# Patient Record
Sex: FEMALE | Race: ASIAN | NOT HISPANIC OR LATINO | URBAN - METROPOLITAN AREA
[De-identification: names, ages, dates, MRNs, and addresses within clinical notes are randomized per-mention and may not be internally consistent; named-entity substitution may affect disease eponyms.]

---

## 2019-07-23 ENCOUNTER — INPATIENT (INPATIENT)
Facility: HOSPITAL | Age: 34
LOS: 2 days | Discharge: HOME | DRG: 443 | End: 2019-07-26
Attending: INTERNAL MEDICINE | Admitting: INTERNAL MEDICINE
Payer: COMMERCIAL

## 2019-07-23 VITALS
DIASTOLIC BLOOD PRESSURE: 70 MMHG | WEIGHT: 119.93 LBS | OXYGEN SATURATION: 99 % | HEART RATE: 71 BPM | RESPIRATION RATE: 16 BRPM | SYSTOLIC BLOOD PRESSURE: 105 MMHG | HEIGHT: 66 IN | TEMPERATURE: 98 F

## 2019-07-23 DIAGNOSIS — K75.9 INFLAMMATORY LIVER DISEASE, UNSPECIFIED: ICD-10-CM

## 2019-07-23 LAB
ALBUMIN SERPL ELPH-MCNC: 3.6 G/DL — SIGNIFICANT CHANGE UP (ref 3.3–5)
ALP SERPL-CCNC: 267 U/L — HIGH (ref 40–120)
ALT FLD-CCNC: 2408 U/L — HIGH (ref 10–45)
ANION GAP SERPL CALC-SCNC: 13 MMOL/L — SIGNIFICANT CHANGE UP (ref 5–17)
APPEARANCE UR: ABNORMAL
AST SERPL-CCNC: 1100 U/L — HIGH (ref 10–40)
BACTERIA # UR AUTO: NEGATIVE — SIGNIFICANT CHANGE UP
BASOPHILS # BLD AUTO: 0 K/UL — SIGNIFICANT CHANGE UP (ref 0–0.2)
BASOPHILS NFR BLD AUTO: 0.8 % — SIGNIFICANT CHANGE UP (ref 0–2)
BILIRUB DIRECT SERPL-MCNC: 2 MG/DL — HIGH (ref 0–0.2)
BILIRUB SERPL-MCNC: 2.7 MG/DL — HIGH (ref 0.2–1.2)
BILIRUB UR-MCNC: ABNORMAL
BUN SERPL-MCNC: 10 MG/DL — SIGNIFICANT CHANGE UP (ref 7–23)
CALCIUM SERPL-MCNC: 8.2 MG/DL — LOW (ref 8.4–10.5)
CHLORIDE SERPL-SCNC: 103 MMOL/L — SIGNIFICANT CHANGE UP (ref 96–108)
CO2 SERPL-SCNC: 24 MMOL/L — SIGNIFICANT CHANGE UP (ref 22–31)
COLOR SPEC: ABNORMAL
CREAT SERPL-MCNC: 0.5 MG/DL — SIGNIFICANT CHANGE UP (ref 0.5–1.3)
DIFF PNL FLD: NEGATIVE — SIGNIFICANT CHANGE UP
EOSINOPHIL # BLD AUTO: 0.1 K/UL — SIGNIFICANT CHANGE UP (ref 0–0.5)
EOSINOPHIL NFR BLD AUTO: 2.2 % — SIGNIFICANT CHANGE UP (ref 0–6)
EPI CELLS # UR: 3 /HPF — SIGNIFICANT CHANGE UP
GAS PNL BLDV: SIGNIFICANT CHANGE UP
GLUCOSE SERPL-MCNC: 86 MG/DL — SIGNIFICANT CHANGE UP (ref 70–99)
GLUCOSE UR QL: NEGATIVE — SIGNIFICANT CHANGE UP
HAV IGM SER-ACNC: REACTIVE
HBV CORE IGM SER-ACNC: SIGNIFICANT CHANGE UP
HBV SURFACE AG SER-ACNC: SIGNIFICANT CHANGE UP
HCG SERPL-ACNC: <2 MIU/ML — SIGNIFICANT CHANGE UP
HCT VFR BLD CALC: 41.4 % — SIGNIFICANT CHANGE UP (ref 34.5–45)
HCV AB S/CO SERPL IA: 0.14 S/CO — SIGNIFICANT CHANGE UP (ref 0–0.99)
HCV AB SERPL-IMP: SIGNIFICANT CHANGE UP
HGB BLD-MCNC: 13.9 G/DL — SIGNIFICANT CHANGE UP (ref 11.5–15.5)
HYALINE CASTS # UR AUTO: 1 /LPF — SIGNIFICANT CHANGE UP (ref 0–2)
KETONES UR-MCNC: ABNORMAL
LEUKOCYTE ESTERASE UR-ACNC: NEGATIVE — SIGNIFICANT CHANGE UP
LIDOCAIN IGE QN: 17 U/L — SIGNIFICANT CHANGE UP (ref 7–60)
LYMPHOCYTES # BLD AUTO: 1.2 K/UL — SIGNIFICANT CHANGE UP (ref 1–3.3)
LYMPHOCYTES # BLD AUTO: 34.7 % — SIGNIFICANT CHANGE UP (ref 13–44)
MCHC RBC-ENTMCNC: 31.9 PG — SIGNIFICANT CHANGE UP (ref 27–34)
MCHC RBC-ENTMCNC: 33.6 GM/DL — SIGNIFICANT CHANGE UP (ref 32–36)
MCV RBC AUTO: 95.1 FL — SIGNIFICANT CHANGE UP (ref 80–100)
MONOCYTES # BLD AUTO: 0.4 K/UL — SIGNIFICANT CHANGE UP (ref 0–0.9)
MONOCYTES NFR BLD AUTO: 11 % — SIGNIFICANT CHANGE UP (ref 2–14)
NEUTROPHILS # BLD AUTO: 1.7 K/UL — LOW (ref 1.8–7.4)
NEUTROPHILS NFR BLD AUTO: 51.3 % — SIGNIFICANT CHANGE UP (ref 43–77)
NITRITE UR-MCNC: NEGATIVE — SIGNIFICANT CHANGE UP
PH UR: 6.5 — SIGNIFICANT CHANGE UP (ref 5–8)
PLATELET # BLD AUTO: 204 K/UL — SIGNIFICANT CHANGE UP (ref 150–400)
POTASSIUM SERPL-MCNC: 3.8 MMOL/L — SIGNIFICANT CHANGE UP (ref 3.5–5.3)
POTASSIUM SERPL-SCNC: 3.8 MMOL/L — SIGNIFICANT CHANGE UP (ref 3.5–5.3)
PROT SERPL-MCNC: 6.3 G/DL — SIGNIFICANT CHANGE UP (ref 6–8.3)
PROT UR-MCNC: ABNORMAL
RBC # BLD: 4.36 M/UL — SIGNIFICANT CHANGE UP (ref 3.8–5.2)
RBC # FLD: 11.4 % — SIGNIFICANT CHANGE UP (ref 10.3–14.5)
RBC CASTS # UR COMP ASSIST: 3 /HPF — SIGNIFICANT CHANGE UP (ref 0–4)
SODIUM SERPL-SCNC: 140 MMOL/L — SIGNIFICANT CHANGE UP (ref 135–145)
SP GR SPEC: 1.02 — SIGNIFICANT CHANGE UP (ref 1.01–1.02)
UROBILINOGEN FLD QL: ABNORMAL
WBC # BLD: 3.3 K/UL — LOW (ref 3.8–10.5)
WBC # FLD AUTO: 3.3 K/UL — LOW (ref 3.8–10.5)
WBC UR QL: 1 /HPF — SIGNIFICANT CHANGE UP (ref 0–5)

## 2019-07-23 PROCEDURE — 99285 EMERGENCY DEPT VISIT HI MDM: CPT

## 2019-07-23 PROCEDURE — 74177 CT ABD & PELVIS W/CONTRAST: CPT | Mod: 26

## 2019-07-23 PROCEDURE — 76705 ECHO EXAM OF ABDOMEN: CPT | Mod: 26,RT

## 2019-07-23 RX ORDER — SODIUM CHLORIDE 9 MG/ML
1000 INJECTION INTRAMUSCULAR; INTRAVENOUS; SUBCUTANEOUS ONCE
Refills: 0 | Status: COMPLETED | OUTPATIENT
Start: 2019-07-23 | End: 2019-07-23

## 2019-07-23 RX ORDER — ONDANSETRON 8 MG/1
4 TABLET, FILM COATED ORAL ONCE
Refills: 0 | Status: COMPLETED | OUTPATIENT
Start: 2019-07-23 | End: 2019-07-23

## 2019-07-23 RX ADMIN — ONDANSETRON 4 MILLIGRAM(S): 8 TABLET, FILM COATED ORAL at 13:13

## 2019-07-23 RX ADMIN — SODIUM CHLORIDE 1000 MILLILITER(S): 9 INJECTION INTRAMUSCULAR; INTRAVENOUS; SUBCUTANEOUS at 13:13

## 2019-07-23 RX ADMIN — SODIUM CHLORIDE 1000 MILLILITER(S): 9 INJECTION INTRAMUSCULAR; INTRAVENOUS; SUBCUTANEOUS at 14:30

## 2019-07-23 NOTE — ED ADULT NURSE NOTE - ED STAT RN HANDOFF DETAILS 2
hand off given to oncoming RN's Tj Brewster and Ruthy Lou. Awaiting urine sample. A&Ox4.  at [facility]

## 2019-07-23 NOTE — ED PROVIDER NOTE - ABDOMINAL EXAM
tender to palpation in epigastric and RUQ/soft/nondistended/tender... tender to palpation in epigastric and RUQ. (-) rebound or guarding/tender.../soft/nondistended

## 2019-07-23 NOTE — ED PROVIDER NOTE - PROGRESS NOTE DETAILS
Navjot Lopez, PGY-2: conversation with patient and  regarding plan and need for hospitalization, discussed severity of disease and need for definitive diagnosis and treatment which would require hospitalization, they stated that they understood and agreed with plan for admission. [place]  #[id number]

## 2019-07-23 NOTE — H&P ADULT - HISTORY OF PRESENT ILLNESS
34F w/ no pmh presenting w/ RUQ abdominal pain. Reports pain began 5 days ago, and associated with nausea and vomiting (last emesis was 2 days ago and was small volume and bilious). Pain is exacerbated by movement or deep inspiration. Has taken Advil, Tylenol and Excedrin for 3-4 days with minimal effect on abdominal pain, states she has taken about 4 pills of extrength tyleol, 4 pills of excedrin and some advil per day. States pain is unrelated to food and went to PMD who referred to GI, patient saw Dr. Tong Batista (GI, [place]) did EGD yesterday which was normal per patient, blood tests showed elevated LFTs (AST 1559, ALT 3150, alk phosph 305, total bili 2.9). Recently came from [place] 3 months ago, no other foreign travel. States she was vaccinated as a child but does not know if she was vaccinated for hepatitis. Had subjective fevers/chills last week that have resolved. Denies diarrhea. SOB, chest pain.    In ED, patient was found to have transaminitis, hyperbilirubinemia and CT and US demonstrating diffusely thickened GB (1.5 cm) with no cholelithiasis or pericholecystic fluid. Surgery was called for evaluation.

## 2019-07-23 NOTE — H&P ADULT - NSHPPHYSICALEXAM_GEN_ALL_CORE
pt. seen and examined, NAD     Vital Signs Last 24 Hrs  T(C): 36.8 (23 Jul 2019 22:12), Max: 36.9 (23 Jul 2019 15:22)  T(F): 98.2 (23 Jul 2019 22:12), Max: 98.4 (23 Jul 2019 15:22)  HR: 52 (23 Jul 2019 22:12) (52 - 71)  BP: 97/63 (23 Jul 2019 22:12) (97/63 - 114/77)  BP(mean): --  RR: 17 (23 Jul 2019 22:12) (15 - 17)  SpO2: 97% (23 Jul 2019 22:12) (97% - 100%)    heent: nc/at, icterus +  neck: supple, no JVD  Lungs: B/L clear, no w/r/r  heart: s1s2 nml  abd: soft, RUQ tenderness, no rebound   ext: no e/c/c, pulses 2+  neuro: aaox3   skin : warm , no rash

## 2019-07-23 NOTE — ED ADULT TRIAGE NOTE - CHIEF COMPLAINT QUOTE
Pt said she is returning Dr Mery Vega call from Friday & today  said she did receive message- only need to call back if has time Sent to the ER for abnormal liver enzymes result. Pt has being complaining of epigastric pain associated with nausea and vomiting for 8 days.

## 2019-07-23 NOTE — ED ADULT NURSE NOTE - OBJECTIVE STATEMENT
35 y/o Female presenting to the ED ambulatory, A&Ox3, sent from MD for abnormal lab results. Pt Italian speaking, requests her  translate at bedside at this time. Pt reports RUQ pain, fever, N/V, headache x 8 days. Pt got an endoscopy and blood work done and they sent here for elevated AST and bilirubin. Pt last travel was from [place] 3 months ago. Pt reports white stool for the past two days. Abdomen soft, nondistended, tender in RUQ. Pt afebrile at this time. Pt denies chest pain, shortness of breath, back pain, sick contact, cough, congestion, diarrhea, blood in the stool, hematemesis, hematuria, dysuria, dizziness, numbness, tingling. Safety and comfort measures provided. Bed in lowest position. Side rails up for safety. Call bell within reach.  remains at bedside.

## 2019-07-23 NOTE — ED ADULT NURSE NOTE - CHIEF COMPLAINT QUOTE
Sent to the ER for abnormal liver enzymes result. Pt has being complaining of epigastric pain associated with nausea and vomiting for 8 days.

## 2019-07-23 NOTE — CONSULT NOTE ADULT - ASSESSMENT
34F w/ no pmh presenting w/ RUQ abdominal pain w/ found to have transaminitis, hyperbilirubinemia and CT and US demonstrating diffusely thickened GB (1.5 cm) with no cholelithiasis or pericholecystic fluid    - Recommend medicine admission for workup of transaminitis and hyperbilirubinemia  - Recommend obtaining coags to calculated MELD score  - Recommend obtaining HIDA to r/u cholecystitis  - Will discuss with Dr. Ángel Alarcon, PGY2  General Surgery 34F w/ no pmh presenting w/ RUQ abdominal pain w/ found to have transaminitis, hyperbilirubinemia and CT and US demonstrating diffusely thickened GB (1.5 cm) with no cholelithiasis or pericholecystic fluid    - Recommend medicine admission for workup of transaminitis and hyperbilirubinemia  - serial abd exams  - d/w with Dr. Ángel Alarcon, PGY2  General Surgery    Addendum: Hepatitis panel positive for Hep A IgM. Unlikely surgical source of RUQ pain at this time.

## 2019-07-23 NOTE — H&P ADULT - NSHPLABSRESULTS_GEN_ALL_CORE
13.9   3.3   )-----------( 204      ( 23 Jul 2019 13:30 )             41.4     07-23    140  |  103  |  10  ----------------------------<  86  3.8   |  24  |  0.50    Ca    8.2<L>      23 Jul 2019 13:30    TPro  6.3  /  Alb  3.6  /  TBili  2.7<H>  /  DBili  2.0<H>  /  AST  1100<H>  /  ALT  2408<H>  /  AlkPhos  267<H>  07-23

## 2019-07-23 NOTE — ED ADULT NURSE NOTE - PRO INTERPRETER NEED 2
How Severe Is Your Acne?: moderate Is This A New Presentation, Or A Follow-Up?: Acne Additional Comments (Use Complete Sentences): Patient is currently 4 weeks pregnant and patient is allergic benzoyl peroxide. patient states that she has very sensitive skin. Females Only: When Was Your Last Menstrual Period?: 8/1/2018 English

## 2019-07-23 NOTE — ED PROVIDER NOTE - OBJECTIVE STATEMENT
Swedish speaking, translation via .  33 yo F with no PMH presenting for fever, nonradiating epigastric and RUQ abdominal pain for 5 days, nausea and vomiting. Pain has occurred for past 5 days, increased on movement or deep inspiration. Has taken Advil, APAP (last 2 pills 2 days ago) and Excedrin with minimal effect on abdominal pain. Unrelated to food. N/V for past 4 days. Seen by MD yesterday, did EGD and blood test with elevated LFTs (AST 1559, ALT 3150, alk phosph 305, total bili 2.9). Recently came from [place] 3 months ago, no other foreign travel.

## 2019-07-23 NOTE — H&P ADULT - PROBLEM SELECTOR PLAN 1
likely Hep A  -pt. came from [place] 3 months ago   -elevated LFT's : trend   -GI consult Dr. arthur : notified   -symptomatic Rx

## 2019-07-24 DIAGNOSIS — R10.9 UNSPECIFIED ABDOMINAL PAIN: ICD-10-CM

## 2019-07-24 DIAGNOSIS — K75.9 INFLAMMATORY LIVER DISEASE, UNSPECIFIED: ICD-10-CM

## 2019-07-24 LAB
ALBUMIN SERPL ELPH-MCNC: 3.1 G/DL — LOW (ref 3.3–5)
ALP SERPL-CCNC: 250 U/L — HIGH (ref 40–120)
ALT FLD-CCNC: 1862 U/L — HIGH (ref 10–45)
ANION GAP SERPL CALC-SCNC: 10 MMOL/L — SIGNIFICANT CHANGE UP (ref 5–17)
AST SERPL-CCNC: 782 U/L — HIGH (ref 10–40)
BILIRUB SERPL-MCNC: 2.1 MG/DL — HIGH (ref 0.2–1.2)
BUN SERPL-MCNC: 8 MG/DL — SIGNIFICANT CHANGE UP (ref 7–23)
CALCIUM SERPL-MCNC: 7.9 MG/DL — LOW (ref 8.4–10.5)
CHLORIDE SERPL-SCNC: 105 MMOL/L — SIGNIFICANT CHANGE UP (ref 96–108)
CO2 SERPL-SCNC: 25 MMOL/L — SIGNIFICANT CHANGE UP (ref 22–31)
CREAT SERPL-MCNC: 0.47 MG/DL — LOW (ref 0.5–1.3)
GLUCOSE SERPL-MCNC: 138 MG/DL — HIGH (ref 70–99)
INR BLD: 0.93 RATIO — SIGNIFICANT CHANGE UP (ref 0.88–1.16)
INR BLD: 0.95 RATIO — SIGNIFICANT CHANGE UP (ref 0.88–1.16)
POTASSIUM SERPL-MCNC: 3.6 MMOL/L — SIGNIFICANT CHANGE UP (ref 3.5–5.3)
POTASSIUM SERPL-SCNC: 3.6 MMOL/L — SIGNIFICANT CHANGE UP (ref 3.5–5.3)
PROT SERPL-MCNC: 5.4 G/DL — LOW (ref 6–8.3)
PROTHROM AB SERPL-ACNC: 10.7 SEC — SIGNIFICANT CHANGE UP (ref 10–12.9)
PROTHROM AB SERPL-ACNC: 10.8 SEC — SIGNIFICANT CHANGE UP (ref 10–13.1)
SODIUM SERPL-SCNC: 140 MMOL/L — SIGNIFICANT CHANGE UP (ref 135–145)

## 2019-07-24 PROCEDURE — 78227 HEPATOBIL SYST IMAGE W/DRUG: CPT | Mod: 26

## 2019-07-24 RX ORDER — SENNA PLUS 8.6 MG/1
2 TABLET ORAL AT BEDTIME
Refills: 0 | Status: DISCONTINUED | OUTPATIENT
Start: 2019-07-24 | End: 2019-07-26

## 2019-07-24 RX ORDER — POLYETHYLENE GLYCOL 3350 17 G/17G
17 POWDER, FOR SOLUTION ORAL DAILY
Refills: 0 | Status: DISCONTINUED | OUTPATIENT
Start: 2019-07-24 | End: 2019-07-26

## 2019-07-24 RX ORDER — SODIUM CHLORIDE 9 MG/ML
1000 INJECTION, SOLUTION INTRAVENOUS
Refills: 0 | Status: DISCONTINUED | OUTPATIENT
Start: 2019-07-24 | End: 2019-07-26

## 2019-07-24 RX ORDER — ONDANSETRON 8 MG/1
4 TABLET, FILM COATED ORAL EVERY 6 HOURS
Refills: 0 | Status: DISCONTINUED | OUTPATIENT
Start: 2019-07-24 | End: 2019-07-26

## 2019-07-24 RX ORDER — PANTOPRAZOLE SODIUM 20 MG/1
40 TABLET, DELAYED RELEASE ORAL
Refills: 0 | Status: DISCONTINUED | OUTPATIENT
Start: 2019-07-24 | End: 2019-07-26

## 2019-07-24 RX ORDER — DOCUSATE SODIUM 100 MG
100 CAPSULE ORAL THREE TIMES A DAY
Refills: 0 | Status: DISCONTINUED | OUTPATIENT
Start: 2019-07-24 | End: 2019-07-26

## 2019-07-24 RX ADMIN — POLYETHYLENE GLYCOL 3350 17 GRAM(S): 17 POWDER, FOR SOLUTION ORAL at 12:00

## 2019-07-24 RX ADMIN — Medication 100 MILLIGRAM(S): at 21:01

## 2019-07-24 RX ADMIN — ONDANSETRON 4 MILLIGRAM(S): 8 TABLET, FILM COATED ORAL at 14:38

## 2019-07-24 RX ADMIN — PANTOPRAZOLE SODIUM 40 MILLIGRAM(S): 20 TABLET, DELAYED RELEASE ORAL at 05:38

## 2019-07-24 RX ADMIN — Medication 100 MILLIGRAM(S): at 12:01

## 2019-07-24 RX ADMIN — SODIUM CHLORIDE 100 MILLILITER(S): 9 INJECTION, SOLUTION INTRAVENOUS at 01:52

## 2019-07-24 RX ADMIN — SENNA PLUS 2 TABLET(S): 8.6 TABLET ORAL at 21:01

## 2019-07-24 RX ADMIN — SODIUM CHLORIDE 100 MILLILITER(S): 9 INJECTION, SOLUTION INTRAVENOUS at 22:37

## 2019-07-24 NOTE — PROGRESS NOTE ADULT - PROBLEM SELECTOR PLAN 1
likely Hep A  -pt. came from [place] 3 months ago   -elevated LFT's : trending down   -PT/INR : nml  -GI consult Dr. arthur : reviewed , recommend monitro PT/INR   -symptomatic Rx

## 2019-07-24 NOTE — CONSULT NOTE ADULT - SUBJECTIVE AND OBJECTIVE BOX
General Surgery Consultation     Patient is a 34y old Female who presents with a chief complaint of abdominal pain    HPI:     34F w/ no pmh presenting w/ RUQ abdominal pain. Reports pain began 5 days ago, and associated with nausea and vomiting (last emesis was 2 days ago and was small volume and bilious). Pain is exacerbated by movement or deep inspiration. Has taken Advil, Tylenol and Excedrin for 3-4 days with minimal effect on abdominal pain, states she has taken about 4 pills of extrength tyleol, 4 pills of excedrin and some advil per day. States pain is unrelated to food and went to PMD who referred to GI, patient saw Dr. Tong Batista (GI, [place]) did EGD yesterday which was normal per patient, blood tests showed elevated LFTs (AST 1559, ALT 3150, alk phosph 305, total bili 2.9). Recently came from [place] 3 months ago, no other foreign travel. States she was vaccinated as a child but does not know if she was vaccinated for hepatitis. Had subjective fevers/chills last week that have resolved. Denies diarrhea. SOB, chest pain.    In ED, patient was found to have transaminitis, hyperbilirubinemia and CT and US demonstrating diffusely thickened GB (1.5 cm) with no cholelithiasis or pericholecystic fluid. Surgery was called for evaluation.      PAST MEDICAL & SURGICAL HISTORY:  No pertinent past medical history  No significant past surgical history      FAMILY HISTORY:  No pertinent family history in first degree relatives      SOCIAL HISTORY:    MEDICATIONS  (STANDING):    MEDICATIONS  (PRN):    Allergies    No Known Allergies    Intolerances        Vital Signs Last 24 Hrs  T(C): 36.4 (2019 18:51), Max: 36.9 (2019 15:22)  T(F): 97.6 (2019 18:51), Max: 98.4 (2019 15:22)  HR: 53 (2019 18:51) (53 - 71)  BP: 100/70 (2019 18:51) (100/70 - 114/77)  BP(mean): --  RR: 16 (2019 18:51) (15 - 16)  SpO2: 97% (2019 18:51) (97% - 100%)  Daily Height in cm: 167.64 (2019 11:24)    Daily     General: NAD, well-nourished  HEENT: Atraumatic, EOMI  Resp: Breathing comfortably on RA  CV: Normal sinus rhythm  Abd: soft, ND, tender in epigastrium and RUQ, no RT/guarding, -Camp's  Ext: ROMIx4, motor strength intact x 4                          13.9   3.3   )-----------( 204      ( 2019 13:30 )             41.4         140  |  103  |  10  ----------------------------<  86  3.8   |  24  |  0.50    Ca    8.2<L>      2019 13:30    TPro  6.3  /  Alb  3.6  /  TBili  2.7<H>  /  DBili  2.0<H>  /  AST  1100<H>  /  ALT  2408<H>  /  AlkPhos  267<H>        Urinalysis Basic - ( 2019 15:33 )    Color: Dark Yellow / Appearance: Slightly Turbid / S.021 / pH: x  Gluc: x / Ketone: Moderate  / Bili: Moderate / Urobili: 2 mg/dL   Blood: x / Protein: Trace / Nitrite: Negative   Leuk Esterase: Negative / RBC: 3 /hpf / WBC 1 /HPF   Sq Epi: x / Non Sq Epi: 3 /hpf / Bacteria: Negative        Radiographic Findings:       < from: CT Abdomen and Pelvis w/ IV Cont (19 @ 16:05) >    IMPRESSION:     Diffuse nonspecific thickened/edematous gallbladder wall as noted on   ultrasound.   Differential diagnosis includes hepatitis.    Trace ascites.    Wall thickening vs. underdistention of the ascending colon.     < end of copied text >    < from: US Abdomen Upper Quadrant Right (19 @ 14:17) >  IMPRESSION:     Diffusely thickened gallbladder wall measuring up to 1.5 cm. Differential   diagnosis includes hepatic cirrhosis, hepatitis, right heart failure,   ascites, or secondary reactive inflammation from acute process within the   right upper quadrant (acute pancreatitis, perforated duodenal ulcer,   peritonitis right). Clinical correlation is advised.                LAI DAMON MD,RADIOLOGY FELLOW  This document has been electronically signed.  SHIV CRENSHAW M.D., ATTENDING RADIOLOGIST  This document has been electronically signed. 2019  2:41PM    < end of copied text >      Assessment:
Patient is a 34y Female     Patient is a 34y old  Female who presents with a chief complaint of RUQ abd pain with N/V (23 Jul 2019 22:30)      HPI:  34F w/ no pmh presenting w/ RUQ abdominal pain. Reports pain began 5 days ago, and associated with nausea and vomiting (last emesis was 2 days ago and was small volume and bilious). Pain is exacerbated by movement or deep inspiration. Has taken Advil, Tylenol and Excedrin for 3-4 days with minimal effect on abdominal pain, states she has taken about 4 pills of extrength tyleol, 4 pills of excedrin and some advil per day. States pain is unrelated to food and went to PMD who referred to GI, patient saw Dr. Tong Batista (GI, [place]) did EGD yesterday which was normal per patient, blood tests showed elevated LFTs (AST 1559, ALT 3150, alk phosph 305, total bili 2.9). Recently came from [place] 3 months ago, no other foreign travel. States she was vaccinated as a child but does not know if she was vaccinated for hepatitis. Had subjective fevers/chills last week that have resolved. Denies diarrhea. SOB, chest pain.    In ED, patient was found to have transaminitis, hyperbilirubinemia and CT and US demonstrating diffusely thickened GB (1.5 cm) with no cholelithiasis or pericholecystic fluid. Surgery was called for evaluation. (23 Jul 2019 22:30)      PAST MEDICAL & SURGICAL HISTORY:  No pertinent past medical history  No significant past surgical history      MEDICATIONS  (STANDING):  dextrose 5% + sodium chloride 0.9%. 1000 milliLiter(s) (100 mL/Hr) IV Continuous <Continuous>  pantoprazole    Tablet 40 milliGRAM(s) Oral before breakfast      Allergies    No Known Allergies    Intolerances        SOCIAL HISTORY:  Denies ETOh,Smoking,     FAMILY HISTORY:  No pertinent family history in first degree relatives      REVIEW OF SYSTEMS:    CONSTITUTIONAL: No weakness, fevers or chills  EYES/ENT: No visual changes;  No vertigo or throat pain   NECK: No pain or stiffness  RESPIRATORY: No cough, wheezing, hemoptysis; No shortness of breath  CARDIOVASCULAR: No chest pain or palpitations  GASTROINTESTINAL: No abdominal or epigastric pain. No nausea, vomiting, or hematemesis; No diarrhea or constipation. No melena or hematochezia.  GENITOURINARY: No dysuria, frequency or hematuria  NEUROLOGICAL: No numbness or weakness  SKIN: No itching, burning, rashes, or lesions   All other review of systems is negative unless indicated above.    VITAL:  T(C): , Max: 36.9 (07-23-19 @ 15:22)  T(F): , Max: 98.4 (07-23-19 @ 15:22)  HR: 54 (07-24-19 @ 05:31)  BP: 101/62 (07-24-19 @ 05:31)  BP(mean): --  RR: 18 (07-24-19 @ 05:31)  SpO2: 97% (07-24-19 @ 05:31)  Wt(kg): --    I and O's:    07-23 @ 07:01  -  07-24 @ 07:00  --------------------------------------------------------  IN: 500 mL / OUT: 0 mL / NET: 500 mL      Height (cm): 167.6 (07-24 @ 00:37)  Weight (kg): 56.518 (07-24 @ 00:37)  BMI (kg/m2): 20.1 (07-24 @ 00:37)  BSA (m2): 1.64 (07-24 @ 00:37)    PHYSICAL EXAM:    Constitutional: NAD  HEENT: PERRLA,   Neck: No JVD  Respiratory: CTA B/L  Cardiovascular: S1 and S2  Gastrointestinal: BS+, soft, NT/ND  Extremities: No peripheral edema  Neurological: A/O x 3, no focal deficits  Psychiatric: Normal mood, normal affect  : No Fuentes  Skin: No rashes  Access: Not applicable  Back: No CVA tenderness    LABS:                        13.9   3.3   )-----------( 204      ( 23 Jul 2019 13:30 )             41.4     07-24    140  |  105  |  8   ----------------------------<  138<H>  3.6   |  25  |  0.47<L>    Ca    7.9<L>      24 Jul 2019 06:00    TPro  5.4<L>  /  Alb  3.1<L>  /  TBili  2.1<H>  /  DBili  x   /  AST  782<H>  /  ALT  1862<H>  /  AlkPhos  250<H>  07-24          RADIOLOGY & ADDITIONAL STUDIES:

## 2019-07-24 NOTE — CONSULT NOTE ADULT - ASSESSMENT
pt with acute hepatitis.  lfts in 1,000 positive hep a igm.  consistent with acute hepatits a.  pt in hida scan.  dicussion in english and via google .  pt understands discussion. had only 2 tylenol.  pt with no sick contacts, dietary indescression or shellfish.  Pt with egd a few days ago negative.  no flapping tremmor.  need to follow pt/inr q 6 hours.  if >1.5 transfer transplant center.  pt stable.  check autoimmune as treatable but unlikely.  follow clinically.

## 2019-07-25 LAB
ALBUMIN SERPL ELPH-MCNC: 3.1 G/DL — LOW (ref 3.3–5)
ALP SERPL-CCNC: 275 U/L — HIGH (ref 40–120)
ALT FLD-CCNC: 1560 U/L — HIGH (ref 10–45)
ANION GAP SERPL CALC-SCNC: 11 MMOL/L — SIGNIFICANT CHANGE UP (ref 5–17)
AST SERPL-CCNC: 572 U/L — HIGH (ref 10–40)
BILIRUB SERPL-MCNC: 1.9 MG/DL — HIGH (ref 0.2–1.2)
BUN SERPL-MCNC: 5 MG/DL — LOW (ref 7–23)
CALCIUM SERPL-MCNC: 8.2 MG/DL — LOW (ref 8.4–10.5)
CHLORIDE SERPL-SCNC: 105 MMOL/L — SIGNIFICANT CHANGE UP (ref 96–108)
CO2 SERPL-SCNC: 23 MMOL/L — SIGNIFICANT CHANGE UP (ref 22–31)
CREAT SERPL-MCNC: 0.48 MG/DL — LOW (ref 0.5–1.3)
GLUCOSE SERPL-MCNC: 113 MG/DL — HIGH (ref 70–99)
HCT VFR BLD CALC: 35.3 % — SIGNIFICANT CHANGE UP (ref 34.5–45)
HGB BLD-MCNC: 11.5 G/DL — SIGNIFICANT CHANGE UP (ref 11.5–15.5)
INR BLD: 0.93 RATIO — SIGNIFICANT CHANGE UP (ref 0.88–1.16)
INR BLD: 0.96 RATIO — SIGNIFICANT CHANGE UP (ref 0.88–1.16)
INR BLD: 0.98 RATIO — SIGNIFICANT CHANGE UP (ref 0.88–1.16)
MCHC RBC-ENTMCNC: 30.3 PG — SIGNIFICANT CHANGE UP (ref 27–34)
MCHC RBC-ENTMCNC: 32.6 GM/DL — SIGNIFICANT CHANGE UP (ref 32–36)
MCV RBC AUTO: 92.9 FL — SIGNIFICANT CHANGE UP (ref 80–100)
MITOCHONDRIA AB SER-ACNC: SIGNIFICANT CHANGE UP
PLATELET # BLD AUTO: 235 K/UL — SIGNIFICANT CHANGE UP (ref 150–400)
POTASSIUM SERPL-MCNC: 4.3 MMOL/L — SIGNIFICANT CHANGE UP (ref 3.5–5.3)
POTASSIUM SERPL-SCNC: 4.3 MMOL/L — SIGNIFICANT CHANGE UP (ref 3.5–5.3)
PROT SERPL-MCNC: 5.6 G/DL — LOW (ref 6–8.3)
PROTHROM AB SERPL-ACNC: 10.6 SEC — SIGNIFICANT CHANGE UP (ref 10–13.1)
PROTHROM AB SERPL-ACNC: 10.8 SEC — SIGNIFICANT CHANGE UP (ref 10–13.1)
PROTHROM AB SERPL-ACNC: 11.3 SEC — SIGNIFICANT CHANGE UP (ref 10–12.9)
RBC # BLD: 3.8 M/UL — SIGNIFICANT CHANGE UP (ref 3.8–5.2)
RBC # FLD: 12.3 % — SIGNIFICANT CHANGE UP (ref 10.3–14.5)
SMOOTH MUSCLE AB SER-ACNC: ABNORMAL
SODIUM SERPL-SCNC: 139 MMOL/L — SIGNIFICANT CHANGE UP (ref 135–145)
WBC # BLD: 2.55 K/UL — LOW (ref 3.8–10.5)
WBC # FLD AUTO: 2.55 K/UL — LOW (ref 3.8–10.5)

## 2019-07-25 RX ORDER — MAGNESIUM HYDROXIDE 400 MG/1
30 TABLET, CHEWABLE ORAL ONCE
Refills: 0 | Status: COMPLETED | OUTPATIENT
Start: 2019-07-25 | End: 2019-07-25

## 2019-07-25 RX ADMIN — MAGNESIUM HYDROXIDE 30 MILLILITER(S): 400 TABLET, CHEWABLE ORAL at 19:56

## 2019-07-25 RX ADMIN — POLYETHYLENE GLYCOL 3350 17 GRAM(S): 17 POWDER, FOR SOLUTION ORAL at 12:41

## 2019-07-25 RX ADMIN — Medication 100 MILLIGRAM(S): at 06:39

## 2019-07-25 RX ADMIN — Medication 100 MILLIGRAM(S): at 21:42

## 2019-07-25 RX ADMIN — PANTOPRAZOLE SODIUM 40 MILLIGRAM(S): 20 TABLET, DELAYED RELEASE ORAL at 06:39

## 2019-07-25 RX ADMIN — SENNA PLUS 2 TABLET(S): 8.6 TABLET ORAL at 21:41

## 2019-07-25 RX ADMIN — Medication 100 MILLIGRAM(S): at 12:41

## 2019-07-25 NOTE — PROGRESS NOTE ADULT - ATTENDING COMMENTS
pt. continue to improve , LFT's down trending , if again LFT's shows improvement , plan for d/c home in am

## 2019-07-26 VITALS
HEART RATE: 55 BPM | RESPIRATION RATE: 18 BRPM | TEMPERATURE: 98 F | OXYGEN SATURATION: 96 % | SYSTOLIC BLOOD PRESSURE: 110 MMHG | DIASTOLIC BLOOD PRESSURE: 70 MMHG

## 2019-07-26 LAB
ALBUMIN SERPL ELPH-MCNC: 2.9 G/DL — LOW (ref 3.3–5)
ALP SERPL-CCNC: 266 U/L — HIGH (ref 40–120)
ALT FLD-CCNC: 1172 U/L — HIGH (ref 10–45)
ANA TITR SER: NEGATIVE — SIGNIFICANT CHANGE UP
ANION GAP SERPL CALC-SCNC: 11 MMOL/L — SIGNIFICANT CHANGE UP (ref 5–17)
AST SERPL-CCNC: 386 U/L — HIGH (ref 10–40)
BILIRUB SERPL-MCNC: 1.6 MG/DL — HIGH (ref 0.2–1.2)
BUN SERPL-MCNC: 7 MG/DL — SIGNIFICANT CHANGE UP (ref 7–23)
CALCIUM SERPL-MCNC: 7.9 MG/DL — LOW (ref 8.4–10.5)
CHLORIDE SERPL-SCNC: 106 MMOL/L — SIGNIFICANT CHANGE UP (ref 96–108)
CO2 SERPL-SCNC: 23 MMOL/L — SIGNIFICANT CHANGE UP (ref 22–31)
CREAT SERPL-MCNC: 0.41 MG/DL — LOW (ref 0.5–1.3)
GLUCOSE SERPL-MCNC: 113 MG/DL — HIGH (ref 70–99)
INR BLD: 0.95 RATIO — SIGNIFICANT CHANGE UP (ref 0.88–1.16)
INR BLD: 0.95 RATIO — SIGNIFICANT CHANGE UP (ref 0.88–1.16)
POTASSIUM SERPL-MCNC: 4.3 MMOL/L — SIGNIFICANT CHANGE UP (ref 3.5–5.3)
POTASSIUM SERPL-SCNC: 4.3 MMOL/L — SIGNIFICANT CHANGE UP (ref 3.5–5.3)
PROT SERPL-MCNC: 5.6 G/DL — LOW (ref 6–8.3)
PROTHROM AB SERPL-ACNC: 10.7 SEC — SIGNIFICANT CHANGE UP (ref 10–13.1)
PROTHROM AB SERPL-ACNC: 10.8 SEC — SIGNIFICANT CHANGE UP (ref 10–12.9)
SODIUM SERPL-SCNC: 140 MMOL/L — SIGNIFICANT CHANGE UP (ref 135–145)

## 2019-07-26 PROCEDURE — 83605 ASSAY OF LACTIC ACID: CPT

## 2019-07-26 PROCEDURE — 74177 CT ABD & PELVIS W/CONTRAST: CPT

## 2019-07-26 PROCEDURE — 85014 HEMATOCRIT: CPT

## 2019-07-26 PROCEDURE — 82947 ASSAY GLUCOSE BLOOD QUANT: CPT

## 2019-07-26 PROCEDURE — 82248 BILIRUBIN DIRECT: CPT

## 2019-07-26 PROCEDURE — 87040 BLOOD CULTURE FOR BACTERIA: CPT

## 2019-07-26 PROCEDURE — 82435 ASSAY OF BLOOD CHLORIDE: CPT

## 2019-07-26 PROCEDURE — 96374 THER/PROPH/DIAG INJ IV PUSH: CPT | Mod: XU

## 2019-07-26 PROCEDURE — 84702 CHORIONIC GONADOTROPIN TEST: CPT

## 2019-07-26 PROCEDURE — 76705 ECHO EXAM OF ABDOMEN: CPT

## 2019-07-26 PROCEDURE — 81001 URINALYSIS AUTO W/SCOPE: CPT

## 2019-07-26 PROCEDURE — 85610 PROTHROMBIN TIME: CPT

## 2019-07-26 PROCEDURE — 86381 MITOCHONDRIAL ANTIBODY EACH: CPT

## 2019-07-26 PROCEDURE — 82803 BLOOD GASES ANY COMBINATION: CPT

## 2019-07-26 PROCEDURE — 85027 COMPLETE CBC AUTOMATED: CPT

## 2019-07-26 PROCEDURE — 86255 FLUORESCENT ANTIBODY SCREEN: CPT

## 2019-07-26 PROCEDURE — 82330 ASSAY OF CALCIUM: CPT

## 2019-07-26 PROCEDURE — 80053 COMPREHEN METABOLIC PANEL: CPT

## 2019-07-26 PROCEDURE — 78227 HEPATOBIL SYST IMAGE W/DRUG: CPT

## 2019-07-26 PROCEDURE — 96361 HYDRATE IV INFUSION ADD-ON: CPT

## 2019-07-26 PROCEDURE — 84295 ASSAY OF SERUM SODIUM: CPT

## 2019-07-26 PROCEDURE — 84132 ASSAY OF SERUM POTASSIUM: CPT

## 2019-07-26 PROCEDURE — 86038 ANTINUCLEAR ANTIBODIES: CPT

## 2019-07-26 PROCEDURE — 99285 EMERGENCY DEPT VISIT HI MDM: CPT | Mod: 25

## 2019-07-26 PROCEDURE — 83690 ASSAY OF LIPASE: CPT

## 2019-07-26 PROCEDURE — 80074 ACUTE HEPATITIS PANEL: CPT

## 2019-07-26 RX ORDER — IBUPROFEN 200 MG
1 TABLET ORAL
Qty: 0 | Refills: 0 | DISCHARGE

## 2019-07-26 RX ORDER — ONDANSETRON 8 MG/1
1 TABLET, FILM COATED ORAL
Qty: 0 | Refills: 0 | DISCHARGE

## 2019-07-26 RX ORDER — LUTEIN 20 MG
0 CAPSULE ORAL
Qty: 0 | Refills: 0 | DISCHARGE

## 2019-07-26 RX ORDER — ASCORBIC ACID 60 MG
0 TABLET,CHEWABLE ORAL
Qty: 0 | Refills: 0 | DISCHARGE

## 2019-07-26 RX ORDER — IBUPROFEN 200 MG
0 TABLET ORAL
Qty: 0 | Refills: 0 | DISCHARGE

## 2019-07-26 RX ORDER — METOCLOPRAMIDE HCL 10 MG
1 TABLET ORAL
Qty: 0 | Refills: 0 | DISCHARGE

## 2019-07-26 RX ORDER — OMEPRAZOLE 10 MG/1
1 CAPSULE, DELAYED RELEASE ORAL
Qty: 0 | Refills: 0 | DISCHARGE

## 2019-07-26 RX ADMIN — Medication 100 MILLIGRAM(S): at 05:20

## 2019-07-26 RX ADMIN — PANTOPRAZOLE SODIUM 40 MILLIGRAM(S): 20 TABLET, DELAYED RELEASE ORAL at 05:20

## 2019-07-26 NOTE — PROGRESS NOTE ADULT - ASSESSMENT
feels well wants to go home, tolerating PO, afeb, LFTs trending down  Acute Hep A should resolve on its own, supportive care, follow lfts as outpt, may develop jaundice during cholestatic phase  ok from GI standpoint for d/c
acute hepaitis a.  d/c plan when lfts down < 1000  conservative managment, chnge inr to bid.  can eat.  check katharine, asm

## 2019-07-26 NOTE — DISCHARGE NOTE PROVIDER - NSDCCPCAREPLAN_GEN_ALL_CORE_FT
PRINCIPAL DISCHARGE DIAGNOSIS  Diagnosis: Hepatitis  Assessment and Plan of Treatment: f/u with Dr Batista nearly nezt week to monitor BS; no etoh or tyleneol

## 2019-07-26 NOTE — DISCHARGE NOTE PROVIDER - CARE PROVIDER_API CALL
Tong Batista)  Gastroenterology; Internal Medicine  [address] [place] [address]  Phone: [phone]  Fax: [phone]  Follow Up Time: 1-3 days

## 2019-07-26 NOTE — DISCHARGE NOTE NURSING/CASE MANAGEMENT/SOCIAL WORK - NSDCPEWEB_GEN_ALL_CORE
[facility] for Tobacco Control website --- [email or web address] website --- [email or web address]

## 2019-07-26 NOTE — DISCHARGE NOTE NURSING/CASE MANAGEMENT/SOCIAL WORK - NSDCPEEMAIL_GEN_ALL_CORE
[facility] for Tobacco Control email [email or web address]

## 2019-07-26 NOTE — PROGRESS NOTE ADULT - SUBJECTIVE AND OBJECTIVE BOX
INTERVAL HPI/OVERNIGHT EVENTS:    MEDICATIONS  (STANDING):  dextrose 5% + sodium chloride 0.9%. 1000 milliLiter(s) (100 mL/Hr) IV Continuous <Continuous>  docusate sodium 100 milliGRAM(s) Oral three times a day  pantoprazole    Tablet 40 milliGRAM(s) Oral before breakfast  senna 2 Tablet(s) Oral at bedtime    MEDICATIONS  (PRN):  ondansetron Injectable 4 milliGRAM(s) IV Push every 6 hours PRN Nausea and/or Vomiting  polyethylene glycol 3350 17 Gram(s) Oral daily PRN Constipation      Allergies    No Known Allergies    Intolerances            PHYSICAL EXAM:   Vital Signs:  Vital Signs Last 24 Hrs  T(C): 36.8 (26 Jul 2019 05:12), Max: 36.8 (26 Jul 2019 05:12)  T(F): 98.3 (26 Jul 2019 05:12), Max: 98.3 (26 Jul 2019 05:12)  HR: 55 (26 Jul 2019 05:12) (55 - 61)  BP: 110/70 (26 Jul 2019 05:12) (97/64 - 110/70)  BP(mean): --  RR: 18 (26 Jul 2019 05:12) (18 - 18)  SpO2: 96% (26 Jul 2019 05:12) (96% - 97%)  Daily     Daily     GENERAL:  no distress  HEENT:  NC/AT,  anicteric  CHEST:   no increased effort, breath sounds clear  HEART:  Regular rhythm  ABDOMEN:  Soft, non-tender, non-distended, normoactive bowel sounds,  no masses ,no hepato-splenomegaly, no signs of chronic liver disease  EXTEREMITIES:  no cyanosis      LABS:                        11.5   2.55  )-----------( 235      ( 25 Jul 2019 09:37 )             35.3     07-26    140  |  106  |  7   ----------------------------<  113<H>  4.3   |  23  |  0.41<L>    Ca    7.9<L>      26 Jul 2019 07:11    TPro  5.6<L>  /  Alb  2.9<L>  /  TBili  1.6<H>  /  DBili  x   /  AST  386<H>  /  ALT  1172<H>  /  AlkPhos  266<H>  07-26    PT/INR - ( 26 Jul 2019 01:58 )   PT: 10.8 sec;   INR: 0.95 ratio               RADIOLOGY & ADDITIONAL TESTS:
Patient is a 34y old  Female who presents with a chief complaint of RUQ abd pain with N/V (2019 09:31)    pt. seen and examined, c/o some nausea , no abd pain     INTERVAL HPI/OVERNIGHT EVENTS:  T(C): 36.4 (19 @ 21:24), Max: 36.8 (19 @ 05:31)  HR: 47 (19 @ 21:24) (47 - 54)  BP: 101/64 (19 @ 21:24) (101/62 - 112/74)  RR: 18 (19 @ 21:24) (18 - 18)  SpO2: 98% (19 @ 21:24) (97% - 98%)  Wt(kg): --  I&O's Summary    2019 07:  -  2019 07:00  --------------------------------------------------------  IN: 500 mL / OUT: 0 mL / NET: 500 mL    2019 07:01  -  2019 02:29  --------------------------------------------------------  IN: 2000 mL / OUT: 0 mL / NET: 2000 mL        PAST MEDICAL & SURGICAL HISTORY:  No pertinent past medical history  No significant past surgical history      SOCIAL HISTORY  Alcohol:  Tobacco:  Illicit substance use:    FAMILY HISTORY:    REVIEW OF SYSTEMS:  CONSTITUTIONAL: No fever, weight loss, or fatigue  EYES: No eye pain, visual disturbances, or discharge  ENMT:  No difficulty hearing, tinnitus, vertigo; No sinus or throat pain  NECK: No pain or stiffness  RESPIRATORY: No cough, wheezing, chills or hemoptysis; No shortness of breath  CARDIOVASCULAR: No chest pain, palpitations, dizziness, or leg swelling  GASTROINTESTINAL: No abdominal or epigastric pain. No nausea, vomiting, or hematemesis; No diarrhea or constipation. No melena or hematochezia.  GENITOURINARY: No dysuria, frequency, hematuria, or incontinence  NEUROLOGICAL: No headaches, memory loss, loss of strength, numbness, or tremors  SKIN: No itching, burning, rashes, or lesions   LYMPH NODES: No enlarged glands  ENDOCRINE: No heat or cold intolerance; No hair loss  MUSCULOSKELETAL: No joint pain or swelling; No muscle, back, or extremity pain  PSYCHIATRIC: No depression, anxiety, mood swings, or difficulty sleeping  HEME/LYMPH: No easy bruising, or bleeding gums  ALLERY AND IMMUNOLOGIC: No hives or eczema    RADIOLOGY & ADDITIONAL TESTS:    Imaging Personally Reviewed:  [ ] YES  [ ] NO    Consultant(s) Notes Reviewed:  [ ] YES  [ ] NO    PHYSICAL EXAM:  GENERAL: NAD, well-groomed, well-developed  HEAD:  Atraumatic, Normocephalic  EYES: EOMI, PERRLA, conjunctiva and sclera clear  ENMT: No tonsillar erythema, exudates, or enlargement; Moist mucous membranes, Good dentition, No lesions  NECK: Supple, No JVD, Normal thyroid  NERVOUS SYSTEM:  Alert & Oriented X3, Good concentration; Motor Strength 5/5 B/L upper and lower extremities; DTRs 2+ intact and symmetric  CHEST/LUNG: Clear to percussion bilaterally; No rales, rhonchi, wheezing, or rubs  HEART: Regular rate and rhythm; No murmurs, rubs, or gallops  ABDOMEN: Soft, Nontender, Nondistended; Bowel sounds present  EXTREMITIES:  2+ Peripheral Pulses, No clubbing, cyanosis, or edema  LYMPH: No lymphadenopathy noted  SKIN: No rashes or lesions    LABS:                        13.9   3.3   )-----------( 204      ( 2019 13:30 )             41.4     -    140  |  105  |  8   ----------------------------<  138<H>  3.6   |  25  |  0.47<L>    Ca    7.9<L>      2019 06:00    TPro  5.4<L>  /  Alb  3.1<L>  /  TBili  2.1<H>  /  DBili  x   /  AST  782<H>  /  ALT  1862<H>  /  AlkPhos  250<H>      PT/INR - ( 2019 01:04 )   PT: 11.3 sec;   INR: 0.98 ratio           Urinalysis Basic - ( 2019 15:33 )    Color: Dark Yellow / Appearance: Slightly Turbid / S.021 / pH: x  Gluc: x / Ketone: Moderate  / Bili: Moderate / Urobili: 2 mg/dL   Blood: x / Protein: Trace / Nitrite: Negative   Leuk Esterase: Negative / RBC: 3 /hpf / WBC 1 /HPF   Sq Epi: x / Non Sq Epi: 3 /hpf / Bacteria: Negative      CAPILLARY BLOOD GLUCOSE            Urinalysis Basic - ( 2019 15:33 )    Color: Dark Yellow / Appearance: Slightly Turbid / S.021 / pH: x  Gluc: x / Ketone: Moderate  / Bili: Moderate / Urobili: 2 mg/dL   Blood: x / Protein: Trace / Nitrite: Negative   Leuk Esterase: Negative / RBC: 3 /hpf / WBC 1 /HPF   Sq Epi: x / Non Sq Epi: 3 /hpf / Bacteria: Negative        MEDICATIONS  (STANDING):  dextrose 5% + sodium chloride 0.9%. 1000 milliLiter(s) (100 mL/Hr) IV Continuous <Continuous>  docusate sodium 100 milliGRAM(s) Oral three times a day  pantoprazole    Tablet 40 milliGRAM(s) Oral before breakfast  senna 2 Tablet(s) Oral at bedtime    MEDICATIONS  (PRN):  ondansetron Injectable 4 milliGRAM(s) IV Push every 6 hours PRN Nausea and/or Vomiting  polyethylene glycol 3350 17 Gram(s) Oral daily PRN Constipation      Care Discussed with Consultants/Other Providers [ ] YES  [ ] NO
Patient is a 34y old  Female who presents with a chief complaint of RUQ abd pain with N/V (25 Jul 2019 08:28)    pt. seen and examined, doing fair, no c/o     INTERVAL HPI/OVERNIGHT EVENTS:  T(C): 36.4 (07-25-19 @ 21:35), Max: 36.6 (07-25-19 @ 06:39)  HR: 59 (07-25-19 @ 21:35) (58 - 61)  BP: 97/64 (07-25-19 @ 21:35) (97/64 - 109/71)  RR: 18 (07-25-19 @ 21:35) (18 - 18)  SpO2: 97% (07-25-19 @ 21:35) (97% - 97%)  Wt(kg): --  I&O's Summary    24 Jul 2019 07:01  -  25 Jul 2019 07:00  --------------------------------------------------------  IN: 3220 mL / OUT: 0 mL / NET: 3220 mL    25 Jul 2019 07:01  -  25 Jul 2019 23:43  --------------------------------------------------------  IN: 2330 mL / OUT: 0 mL / NET: 2330 mL        PAST MEDICAL & SURGICAL HISTORY:  No pertinent past medical history  No significant past surgical history      SOCIAL HISTORY  Alcohol:  Tobacco:  Illicit substance use:    FAMILY HISTORY:    REVIEW OF SYSTEMS:  CONSTITUTIONAL: No fever, weight loss, or fatigue  EYES: No eye pain, visual disturbances, or discharge  ENMT:  No difficulty hearing, tinnitus, vertigo; No sinus or throat pain  NECK: No pain or stiffness  RESPIRATORY: No cough, wheezing, chills or hemoptysis; No shortness of breath  CARDIOVASCULAR: No chest pain, palpitations, dizziness, or leg swelling  GASTROINTESTINAL: No abdominal or epigastric pain. No nausea, vomiting, or hematemesis; No diarrhea or constipation. No melena or hematochezia.  GENITOURINARY: No dysuria, frequency, hematuria, or incontinence  NEUROLOGICAL: No headaches, memory loss, loss of strength, numbness, or tremors  SKIN: No itching, burning, rashes, or lesions   LYMPH NODES: No enlarged glands  ENDOCRINE: No heat or cold intolerance; No hair loss  MUSCULOSKELETAL: No joint pain or swelling; No muscle, back, or extremity pain  PSYCHIATRIC: No depression, anxiety, mood swings, or difficulty sleeping  HEME/LYMPH: No easy bruising, or bleeding gums  ALLERY AND IMMUNOLOGIC: No hives or eczema    RADIOLOGY & ADDITIONAL TESTS:    Imaging Personally Reviewed:  [ ] YES  [ ] NO    Consultant(s) Notes Reviewed:  [ ] YES  [ ] NO    PHYSICAL EXAM:  GENERAL: NAD, well-groomed, well-developed  HEAD:  Atraumatic, Normocephalic  EYES: EOMI, PERRLA, conjunctiva and sclera clear  ENMT: No tonsillar erythema, exudates, or enlargement; Moist mucous membranes, Good dentition, No lesions  NECK: Supple, No JVD, Normal thyroid  NERVOUS SYSTEM:  Alert & Oriented X3, Good concentration; Motor Strength 5/5 B/L upper and lower extremities; DTRs 2+ intact and symmetric  CHEST/LUNG: Clear to percussion bilaterally; No rales, rhonchi, wheezing, or rubs  HEART: Regular rate and rhythm; No murmurs, rubs, or gallops  ABDOMEN: Soft, Nontender, Nondistended; Bowel sounds present  EXTREMITIES:  2+ Peripheral Pulses, No clubbing, cyanosis, or edema  LYMPH: No lymphadenopathy noted  SKIN: No rashes or lesions    LABS:                        11.5   2.55  )-----------( 235      ( 25 Jul 2019 09:37 )             35.3     07-25    139  |  105  |  5<L>  ----------------------------<  113<H>  4.3   |  23  |  0.48<L>    Ca    8.2<L>      25 Jul 2019 07:08    TPro  5.6<L>  /  Alb  3.1<L>  /  TBili  1.9<H>  /  DBili  x   /  AST  572<H>  /  ALT  1560<H>  /  AlkPhos  275<H>  07-25    PT/INR - ( 25 Jul 2019 21:09 )   PT: 10.6 sec;   INR: 0.93 ratio             CAPILLARY BLOOD GLUCOSE                MEDICATIONS  (STANDING):  dextrose 5% + sodium chloride 0.9%. 1000 milliLiter(s) (100 mL/Hr) IV Continuous <Continuous>  docusate sodium 100 milliGRAM(s) Oral three times a day  pantoprazole    Tablet 40 milliGRAM(s) Oral before breakfast  senna 2 Tablet(s) Oral at bedtime    MEDICATIONS  (PRN):  ondansetron Injectable 4 milliGRAM(s) IV Push every 6 hours PRN Nausea and/or Vomiting  polyethylene glycol 3350 17 Gram(s) Oral daily PRN Constipation      Care Discussed with Consultants/Other Providers [ ] YES  [ ] NO
SOLEDAD Clyman:[id number],   34yFemale followed for:  No Known Allergies    PAST MEDICAL & SURGICAL HISTORY:  No pertinent past medical history  No significant past surgical history    FAMILY HISTORY:  No pertinent family history in first degree relatives    MEDICATIONS  (STANDING):  dextrose 5% + sodium chloride 0.9%. 1000 milliLiter(s) (100 mL/Hr) IV Continuous <Continuous>  docusate sodium 100 milliGRAM(s) Oral three times a day  pantoprazole    Tablet 40 milliGRAM(s) Oral before breakfast  senna 2 Tablet(s) Oral at bedtime    MEDICATIONS  (PRN):  ondansetron Injectable 4 milliGRAM(s) IV Push every 6 hours PRN Nausea and/or Vomiting  polyethylene glycol 3350 17 Gram(s) Oral daily PRN Constipation      Vital Signs Last 24 Hrs  T(C): 36.6 (25 Jul 2019 06:39), Max: 36.7 (24 Jul 2019 14:10)  T(F): 97.8 (25 Jul 2019 06:39), Max: 98.1 (24 Jul 2019 14:10)  HR: 58 (25 Jul 2019 06:39) (47 - 58)  BP: 109/71 (25 Jul 2019 06:39) (101/64 - 112/74)  BP(mean): --  RR: 18 (25 Jul 2019 06:39) (18 - 18)  SpO2: 97% (25 Jul 2019 06:39) (97% - 98%)  nc/at  s1s2  cta  soft, nt, nd no guarding or rebound  no c/c/e    CBC Full  -  ( 23 Jul 2019 13:30 )  WBC Count : 3.3 K/uL  RBC Count : 4.36 M/uL  Hemoglobin : 13.9 g/dL  Hematocrit : 41.4 %  Platelet Count - Automated : 204 K/uL  Mean Cell Volume : 95.1 fl  Mean Cell Hemoglobin : 31.9 pg  Mean Cell Hemoglobin Concentration : 33.6 gm/dL  Auto Neutrophil # : 1.7 K/uL  Auto Lymphocyte # : 1.2 K/uL  Auto Monocyte # : 0.4 K/uL  Auto Eosinophil # : 0.1 K/uL  Auto Basophil # : 0.0 K/uL  Auto Neutrophil % : 51.3 %  Auto Lymphocyte % : 34.7 %  Auto Monocyte % : 11.0 %  Auto Eosinophil % : 2.2 %  Auto Basophil % : 0.8 %    07-25    139  |  105  |  5<L>  ----------------------------<  113<H>  4.3   |  23  |  0.48<L>    Ca    8.2<L>      25 Jul 2019 07:08    TPro  5.6<L>  /  Alb  3.1<L>  /  TBili  1.9<H>  /  DBili  x   /  AST  572<H>  /  ALT  1560<H>  /  AlkPhos  275<H>  07-25    PT/INR - ( 25 Jul 2019 01:04 )   PT: 11.3 sec;   INR: 0.98 ratio

## 2019-07-26 NOTE — DISCHARGE NOTE NURSING/CASE MANAGEMENT/SOCIAL WORK - [email or web address]_GEN_ALL_CORE
You can access the [facility], offered by [facility], by registering with the following website: [email or web address]

## 2019-07-28 LAB
CULTURE RESULTS: SIGNIFICANT CHANGE UP
CULTURE RESULTS: SIGNIFICANT CHANGE UP
SPECIMEN SOURCE: SIGNIFICANT CHANGE UP
SPECIMEN SOURCE: SIGNIFICANT CHANGE UP

## 2021-04-29 NOTE — ED PROVIDER NOTE - ATTENDING CONTRIBUTION TO CARE
PRINCIPAL PROCEDURE  Procedure: Total hip replacement  Findings and Treatment: right       ****ATTENDING**** 33yo female [place] speaking with her  presents with abdominal pain x 5 d. Pain is epigastric sharp non radiating. +nausea and vomiting x 2 d ago. no diarrhea. + tactile fever. pt went to see Gastroenterologist yesterday and had outpatient endoscopy yesterday and outpatient blood work with transaminitis. Pt denies recent travel or sick contact. social smoker, denies alcohol or drug abuse. Denies uri, sore throat.  on exam, Patient is awake,alert,oriented x 3. Patient is well appearing and in no acute distress. sclera icteric. Patient's chest is clear to ausculation, +s1s2. Abdomen is soft nd/+epigastric and ruq tender +BS. Extremity with no swelling or calf tenderness.   check labs including hepatitis, HIV. US and CT to eval. IVF and anti emetics. Pt declined pain meds.